# Patient Record
Sex: FEMALE | Employment: FULL TIME
[De-identification: names, ages, dates, MRNs, and addresses within clinical notes are randomized per-mention and may not be internally consistent; named-entity substitution may affect disease eponyms.]

---

## 2022-03-17 ENCOUNTER — NURSE TRIAGE (OUTPATIENT)
Dept: OTHER | Facility: CLINIC | Age: 32
End: 2022-03-17

## 2022-03-17 NOTE — TELEPHONE ENCOUNTER
Received call from Sandy Barfield at [facility] with Red Flag Complaint; Patient with Red Flag Complaint requesting to establish care with [facility]. Subjective: Caller states \"Abd pain\"     Current Symptoms: Lower abd pain, pelvic area x 2-3 days. Pain with BM. Spine pain. Feel dizzy every time after eating for the last month. Black stool. Onset: 3 days ago; intermittent    Associated Symptoms: NA    Pain Severity: 6/10; pain; intermittent    Temperature: denies fever    What has been tried: dietary changes    LMP: 3/1/22 Pregnant: No    Recommended disposition: Go to ED Now    Care advice provided, patient verbalizes understanding; denies any other questions or concerns; instructed to call back for any new or worsening symptoms. Patient/Caller agrees with recommended disposition; writer provided warm transfer to [facility] for appointment scheduling    Attention Provider: Thank you for allowing me to participate in the care of your patient. The patient was connected to triage in response to information provided to the [facility]. Please do not respond through this encounter as the response is not directed to a shared pool.     Reason for Disposition   Black or tarry bowel movements   Black or tarry bowel movements (Exception: chronic-unchanged black-grey bowel movements AND is taking iron pills or Pepto-bismol)    Protocols used: STOOLS - UNUSUAL COLOR-ADULT-AH, RECTAL BLEEDING-ADULT-AH

## 2022-10-31 ENCOUNTER — HOSPITAL ENCOUNTER (EMERGENCY)
Age: 32
Discharge: HOME | End: 2022-10-31
Attending: EMERGENCY MEDICINE

## 2022-10-31 VITALS
SYSTOLIC BLOOD PRESSURE: 113 MMHG | RESPIRATION RATE: 16 BRPM | OXYGEN SATURATION: 100 % | HEART RATE: 57 BPM | DIASTOLIC BLOOD PRESSURE: 66 MMHG | TEMPERATURE: 97.6 F

## 2022-10-31 DIAGNOSIS — R51.9 NONINTRACTABLE EPISODIC HEADACHE, UNSPECIFIED HEADACHE TYPE: Primary | ICD-10-CM

## 2022-10-31 LAB
APPEARANCE UR: CLEAR
BACTERIA URNS QL MICRO: ABNORMAL /HPF
BILIRUB UR QL: NEGATIVE
COLOR UR: YELLOW
EPITH CASTS URNS QL MICRO: ABNORMAL /LPF (ref 0–5)
FLUAV RNA SPEC QL NAA+PROBE: NOT DETECTED
FLUBV RNA SPEC QL NAA+PROBE: NOT DETECTED
GLUCOSE UR STRIP.AUTO-MCNC: NEGATIVE MG/DL
HCG UR QL: NEGATIVE
HGB UR QL STRIP: ABNORMAL
KETONES UR QL STRIP.AUTO: NEGATIVE MG/DL
LEUKOCYTE ESTERASE UR QL STRIP.AUTO: ABNORMAL
NITRITE UR QL STRIP.AUTO: NEGATIVE
PH UR STRIP: 6 [PH] (ref 5–8)
PROT UR STRIP-MCNC: NEGATIVE MG/DL
RBC #/AREA URNS HPF: ABNORMAL /HPF (ref 0–5)
SARS-COV-2, COV2: NOT DETECTED
SP GR UR REFRACTOMETRY: 1 (ref 1–1.03)
UROBILINOGEN UR QL STRIP.AUTO: 0.2 EU/DL (ref 0.2–1)
WBC URNS QL MICRO: ABNORMAL /HPF (ref 0–4)

## 2022-10-31 PROCEDURE — 99283 EMERGENCY DEPT VISIT LOW MDM: CPT

## 2022-10-31 PROCEDURE — 81025 URINE PREGNANCY TEST: CPT

## 2022-10-31 PROCEDURE — 87636 SARSCOV2 & INF A&B AMP PRB: CPT

## 2022-10-31 PROCEDURE — 81001 URINALYSIS AUTO W/SCOPE: CPT

## 2022-10-31 RX ORDER — NAPROXEN 500 MG/1
500 TABLET ORAL 2 TIMES DAILY WITH MEALS
Qty: 20 TABLET | Refills: 0 | Status: SHIPPED | OUTPATIENT
Start: 2022-10-31 | End: 2022-11-10

## 2022-10-31 RX ORDER — IBUPROFEN 600 MG/1
600 TABLET ORAL
Qty: 20 TABLET | Refills: 0 | Status: SHIPPED | OUTPATIENT
Start: 2022-10-31 | End: 2022-10-31 | Stop reason: ALTCHOICE

## 2022-10-31 NOTE — ED PROVIDER NOTES
EMERGENCY DEPARTMENT HISTORY AND PHYSICAL EXAM    3:49 PM      Date: 10/31/2022  Patient Name: Jannie Hunt    History of Presenting Illness     Chief Complaint   Patient presents with    Headache    Allergic Reaction         History Provided By: Patient    Additional History (Context): Jannie Hunt is a 34 y.o. female with No significant past medical history who presents with complaint of intermittent posterior headache associated with chills x1 day. Patient notes she felt flushed prior to arrival.  Patient notes she did not take any medication for symptoms prior to arrival.  Patient denies head injury, dizziness, changes in vision, ear pain, sore throat, chest pain, shortness of breath, cough, nausea or vomiting. Denies [facility]. Patient denies alleviating or exacerbating factors. .  Notes she is vaccinated for COVID. Denies sick contacts or known exposure to Elyssa Rivers. Notes last menstrual cycle 10/30. PCP: None    Current Outpatient Medications   Medication Sig Dispense Refill    naproxen (Naprosyn) 500 mg tablet Take 1 Tablet by mouth two (2) times daily (with meals) for 10 days. 20 Tablet 0       Past History     Past Medical History:  No past medical history on file. Past Surgical History:  No past surgical history on file. Family History:  No family history on file. Social History: Allergies:  No Known Allergies      Review of Systems       Review of Systems   Constitutional:  Positive for chills. Negative for fever. Respiratory:  Negative for shortness of breath. Cardiovascular:  Negative for chest pain. Gastrointestinal:  Negative for abdominal pain, nausea and vomiting. Skin:  Negative for rash. Neurological:  Positive for headaches. Negative for weakness. All other systems reviewed and are negative. Physical Exam   Visit Vitals  /66   Pulse (!) 57   Temp 97.6 °F (36.4 °C)   Resp 16   SpO2 100%         Physical Exam  Vitals and nursing note reviewed. Constitutional:       General: She is not in acute distress. Appearance: Normal appearance. She is well-developed. She is not ill-appearing, toxic-appearing or diaphoretic. HENT:      Head: Normocephalic and atraumatic. Right Ear: Tympanic membrane, ear canal and external ear normal.      Left Ear: Tympanic membrane, ear canal and external ear normal.      Nose: Nose normal.      Mouth/Throat:      Mouth: Mucous membranes are moist.      Pharynx: No oropharyngeal exudate or posterior oropharyngeal erythema. Eyes:      Pupils: Pupils are equal, round, and reactive to light. Cardiovascular:      Rate and Rhythm: Normal rate and regular rhythm. Heart sounds: Normal heart sounds. No murmur heard. No friction rub. No gallop. Pulmonary:      Effort: Pulmonary effort is normal. No respiratory distress. Breath sounds: Normal breath sounds. No wheezing or rales. Musculoskeletal:         General: Normal range of motion. Cervical back: Normal range of motion and neck supple. No rigidity. Lymphadenopathy:      Cervical: No cervical adenopathy. Skin:     General: Skin is warm. Findings: No rash. Neurological:      General: No focal deficit present. Mental Status: She is alert and oriented to person, place, and time. Cranial Nerves: No cranial nerve deficit. Sensory: No sensory deficit. Motor: No weakness.       Gait: Gait normal.         Diagnostic Study Results     Labs -  Recent Results (from the past 12 hour(s))   COVID-19 WITH INFLUENZA A/B    Collection Time: 10/31/22 11:59 AM   Result Value Ref Range    SARS-CoV-2 by PCR Not detected NOTD      Influenza A by PCR Not detected NOTD      Influenza B by PCR Not detected NOTD     URINALYSIS W/ RFLX MICROSCOPIC    Collection Time: 10/31/22 12:00 PM   Result Value Ref Range    Color YELLOW      Appearance CLEAR      Specific gravity 1.005 1.005 - 1.030      pH (UA) 6.0 5.0 - 8.0      Protein Negative NEG mg/dL Glucose Negative NEG mg/dL    Ketone Negative NEG mg/dL    Bilirubin Negative NEG      Blood SMALL (A) NEG      Urobilinogen 0.2 0.2 - 1.0 EU/dL    Nitrites Negative NEG      Leukocyte Esterase TRACE (A) NEG     HCG URINE, QL    Collection Time: 10/31/22 12:00 PM   Result Value Ref Range    HCG urine, QL Negative NEG     URINE MICROSCOPIC ONLY    Collection Time: 10/31/22 12:00 PM   Result Value Ref Range    WBC 4 to 10 0 - 4 /hpf    RBC 0 to 3 0 - 5 /hpf    Epithelial cells 2+ 0 - 5 /lpf    Bacteria 2+ (A) NEG /hpf       Radiologic Studies -   No orders to display         Medical Decision Making   I am the first provider for this patient. I reviewed the vital signs, available nursing notes, past medical history, past surgical history, family history and social history. Vital Signs-Reviewed the patient's vital signs. Records Reviewed: Nursing Notes and Old Medical Records (Time of Review: 3:49 PM)    ED Course: Progress Notes, Reevaluation, and Consults:  2:45 PM: Pt declined medication in the ED. Requesting Naprosyn prescription. Reviewed results and plan with patient. Discussed need for close outpatient follow-up this week for reassessment. Discussed strict return precautions, including fever, vomiting, or any other medical concerns. In agreement with plan, ready to go home    Provider Notes (Medical Decision Making): 50-year-old female who presents to the ED due to intermittent posterior headache associated with chills. Afebrile, nontoxic-appearing, looks well. Alert and oriented x 3, no neurologic deficits on examination. No nuchal rigidity or rash. Not WHOL. COVID and influenza negative. Patient is stable for discharge with symptomatic management, close outpatient follow-up for further assessment. Strict return precautions provided      Diagnosis     Clinical Impression:   1.  Nonintractable episodic headache, unspecified headache type        Disposition: home     Follow-up Information Follow up With Specialties Details Why [address] [facility]    [facility] EMERGENCY DE[facility] Emergency Medicine  If symptoms worsen [address] [address]  [address]  Schedule an appointment as soon as possible for a visit   [address] [place] [address]             Discharge Medication List as of 10/31/2022  2:30 PM        START taking these medications    Details   ibuprofen (MOTRIN) 600 mg tablet Take 1 Tablet by mouth every six (6) hours as needed for Pain., Print, Disp-20 Tablet, R-0             Dictation disclaimer:  Please note that this dictation was completed with [employer], the computer voice recognition software. Quite often unanticipated grammatical, syntax, homophones, and other interpretive errors are inadvertently transcribed by the computer software. Please disregard these errors. Please excuse any errors that have escaped final proofreading.

## 2022-10-31 NOTE — Clinical Note
[facility] EMERGENCY DEPT  [id number] [facility] [address] [phone]    Work/School Note    Date: 10/31/2022    To Whom It May concern:    Macario Shea was seen and treated today in the emergency room by the following provider(s):  Attending Provider: Jules Finley MD  Physician Assistant: Luverne Gowers, PA.      Macario Shea is excused from work/school on 10/31/22 and 11/01/22. She is medically clear to return to work/school on 11/2/2022.        Sincerely,          ISRAEL Serrano

## 2022-10-31 NOTE — ED TRIAGE NOTES
Pt. Arrives to the ED endorsing intermittent heaaches that started this morning and now feels as if she is having a skin reaction. No hives appreciated in triage. Pt. Denies taking anything new or medications to help with her headache.

## 2022-10-31 NOTE — Clinical Note
[address] [place] [address] Dr Cata Mathews [facility] EMERGENCY DEPT  [id number] [address] [phone]    Work/School Note    Date: 10/31/2022    To Whom It May concern:    Jay Meza was seen and treated today in the emergency room by the following provider(s):  Attending Provider: Marek Couch MD  Physician Assistant: ISRAEL Dickson.      Jay Meza is excused from work/school on 10/31/22 and 11/01/22. She is medically clear to return to work/school on 11/2/2022.        Sincerely,          ISRAEL Schuster